# Patient Record
Sex: MALE | Race: WHITE
[De-identification: names, ages, dates, MRNs, and addresses within clinical notes are randomized per-mention and may not be internally consistent; named-entity substitution may affect disease eponyms.]

---

## 2022-08-18 ENCOUNTER — HOSPITAL ENCOUNTER (OUTPATIENT)
Dept: HOSPITAL 47 - RADUSWWP | Age: 62
Discharge: HOME | End: 2022-08-18
Attending: PHYSICIAN ASSISTANT
Payer: COMMERCIAL

## 2022-08-18 DIAGNOSIS — N40.1: Primary | ICD-10-CM

## 2022-08-18 DIAGNOSIS — R97.20: ICD-10-CM

## 2022-08-18 DIAGNOSIS — R35.1: ICD-10-CM

## 2022-08-18 PROCEDURE — 76872 US TRANSRECTAL: CPT

## 2022-08-18 NOTE — US
EXAMINATION TYPE: US prostate transrectal

 

DATE OF EXAM: 8/18/2022

 

COMPARISON: NONE

 

CLINICAL HISTORY: N40.1 BENIGN PROSTATIC HYPERPLASIA WITH NOCTURIA. Benign prostatic hyperplasia with
 nocturia. Abnormal PSA and difficulty urinating per order.

 

This examination was performed using the transrectal probe. 

 

EXAM MEASUREMENTS:

 

Gland Size: 6.2 x 6.0 x 3.7 cm.

Volume: 72.41 ml

Predicted PSA:  8.69

Actual PSA (if available): 2.9

 

Heterogeneous gland. No distinct nodule visualized in peripheral zone.

 

 

 

IMPRESSION:  Benign prostatic hypertrophy without suspicious lesion.

 

 

Predicted PSA = volume x 0.12 ng/ml

Calculated Volume = 0.5236 x L x W x H